# Patient Record
Sex: FEMALE | Race: BLACK OR AFRICAN AMERICAN | Employment: UNEMPLOYED | URBAN - METROPOLITAN AREA
[De-identification: names, ages, dates, MRNs, and addresses within clinical notes are randomized per-mention and may not be internally consistent; named-entity substitution may affect disease eponyms.]

---

## 2024-01-20 ENCOUNTER — HOSPITAL ENCOUNTER (EMERGENCY)
Age: 19
Discharge: HOME | End: 2024-01-20
Attending: EMERGENCY MEDICINE
Payer: COMMERCIAL

## 2024-01-20 VITALS
HEIGHT: 59 IN | SYSTOLIC BLOOD PRESSURE: 107 MMHG | DIASTOLIC BLOOD PRESSURE: 66 MMHG | HEART RATE: 87 BPM | WEIGHT: 145.94 LBS | BODY MASS INDEX: 29.42 KG/M2 | RESPIRATION RATE: 16 BRPM | TEMPERATURE: 99.3 F | OXYGEN SATURATION: 99 %

## 2024-01-20 DIAGNOSIS — H66.002 NON-RECURRENT ACUTE SUPPURATIVE OTITIS MEDIA OF LEFT EAR WITHOUT SPONTANEOUS RUPTURE OF TYMPANIC MEMBRANE: Primary | ICD-10-CM

## 2024-01-20 PROCEDURE — 6370000000 HC RX 637 (ALT 250 FOR IP): Performed by: EMERGENCY MEDICINE

## 2024-01-20 PROCEDURE — 99283 EMERGENCY DEPT VISIT LOW MDM: CPT

## 2024-01-20 RX ORDER — IBUPROFEN 600 MG/1
600 TABLET ORAL 3 TIMES DAILY PRN
Qty: 30 TABLET | Refills: 0 | Status: SHIPPED | OUTPATIENT
Start: 2024-01-20

## 2024-01-20 RX ORDER — ACETAMINOPHEN 500 MG
1000 TABLET ORAL ONCE
Status: COMPLETED | OUTPATIENT
Start: 2024-01-20 | End: 2024-01-20

## 2024-01-20 RX ORDER — AMOXICILLIN AND CLAVULANATE POTASSIUM 875; 125 MG/1; MG/1
1 TABLET, FILM COATED ORAL 2 TIMES DAILY
Qty: 14 TABLET | Refills: 0 | Status: SHIPPED | OUTPATIENT
Start: 2024-01-20 | End: 2024-01-27

## 2024-01-20 RX ADMIN — ACETAMINOPHEN 1000 MG: 500 TABLET ORAL at 04:11

## 2024-01-20 ASSESSMENT — LIFESTYLE VARIABLES
HOW OFTEN DO YOU HAVE A DRINK CONTAINING ALCOHOL: NEVER
HOW MANY STANDARD DRINKS CONTAINING ALCOHOL DO YOU HAVE ON A TYPICAL DAY: PATIENT DOES NOT DRINK

## 2024-01-20 ASSESSMENT — PAIN - FUNCTIONAL ASSESSMENT: PAIN_FUNCTIONAL_ASSESSMENT: 0-10

## 2024-01-20 ASSESSMENT — PAIN DESCRIPTION - LOCATION: LOCATION: EAR

## 2024-01-20 ASSESSMENT — PAIN SCALES - GENERAL: PAINLEVEL_OUTOF10: 6

## 2024-01-20 ASSESSMENT — PAIN DESCRIPTION - ORIENTATION: ORIENTATION: LEFT

## 2024-01-20 ASSESSMENT — PAIN DESCRIPTION - DESCRIPTORS: DESCRIPTORS: SHOOTING

## 2024-01-20 ASSESSMENT — PAIN DESCRIPTION - PAIN TYPE: TYPE: ACUTE PAIN

## 2024-01-20 NOTE — DISCHARGE INSTRUCTIONS
Take ibuprofen and/or acetaminophen as needed for pain.  Take Augmentin antibiotic twice daily for 1 week for ear infection.  Seek medical attention for worsening symptoms.

## 2024-01-20 NOTE — ED PROVIDER NOTES
(ADVIL;MOTRIN) 600 MG tablet Take 1 tablet by mouth 3 times daily as needed for Pain 30 tablet 0    amoxicillin-clavulanate (AUGMENTIN) 875-125 MG per tablet Take 1 tablet by mouth 2 times daily for 7 days 14 tablet 0     No Known Allergies      PHYSICAL EXAM  ED Triage Vitals [01/20/24 0326]   BP Temp Temp Source Pulse Respirations SpO2 Height Weight - Scale   107/66 99.3 °F (37.4 °C) Oral 87 16 99 % 1.499 m (4' 11\") 66.2 kg (145 lb 15.1 oz)     General appearance: Awake and alert. Cooperative. No acute distress.  HEENT: Normocephalic. Atraumatic. Mucous membranes are moist.  Pupils equal reactive, extraocular movements intact, right TM normal, left TM bulging, dull with opaque fluid behind TM, no mastoid tenderness  Neck: Supple, no stridor.    Heart/Chest: RRR. No murmurs.    Vascular: symmetric/intact radial and DP pulses bilaterally  Lungs: Respirations unlabored. CTAB. Good air exchange. Speaking comfortably in full sentences.   Abdomen: Soft. Non-tender. Non-distended. No rebound or guarding.   Musculoskeletal: No deformity.   Skin: Warm and dry. No acute rashes.   Neurological: Alert and oriented. CN II-XII intact. Strength 5/5 bilateral upper and lower extremities. Sensation intact to light touch. Gait normal.      LABS  I have reviewed all labs for this visit.   No results found for this visit on 01/20/24.      RADIOLOGY  I have reviewed all radiographic studies for this visit.   No orders to display          ED COURSE/MDM    18 y.o. female presents with left ear pain.  She appears to have acute otitis media on exam.  She is nontoxic-appearing with no evidence of mastoiditis.  No evidence of TM rupture on exam.  We will empirically treat with Augmentin x 7-day course.  She was also prescribed ibuprofen for pain control and given a dose of acetaminophen in the emergency department for additional pain control.    The patient was advised to follow-up with primary care as needed.  Discharge instructions  including strict return precautions were given to the patient.  All questions were addressed. The patient verbalizes understanding and is in agreement with the plan.      - Consults:   None         Is this patient to be included in the SEP-1 Core Measure?  No Exclusion criteria - the patient is NOT to be included for SEP-1 Core Measure due to: 2+ SIRS criteria are not met    Caroline FLEMING MD, am the primary clinician of record.     During the patient's ED course, the patient was given:  Medications   acetaminophen (TYLENOL) tablet 1,000 mg (has no administration in time range)        Patient was given prescriptions for the following medications. I counseled the patient as to how to take these medications.  New Prescriptions    AMOXICILLIN-CLAVULANATE (AUGMENTIN) 875-125 MG PER TABLET    Take 1 tablet by mouth 2 times daily for 7 days    IBUPROFEN (ADVIL;MOTRIN) 600 MG TABLET    Take 1 tablet by mouth 3 times daily as needed for Pain       Patient instructed to follow up with:   [place] [id number]  [phone]    If symptoms worsen      All questions were answered and the patient/family expressed understanding and agreement with the plan.     PROCEDURES  None    CRITICAL CARE  N/A    CLINICAL IMPRESSION  1. Non-recurrent acute suppurative otitis media of left ear without spontaneous rupture of tympanic membrane        DISPOSITION  Decision To Discharge 01/20/2024 03:58:45 AM     Caroline Wood MD    Note: This chart was created using voice recognition dictation software. Efforts were made by me to ensure accuracy, however some errors may be present due to limitations of this technology and occasionally words are not transcribed correctly.       Caroline Wood MD  01/20/24 4063